# Patient Record
Sex: MALE | Race: OTHER | HISPANIC OR LATINO | URBAN - METROPOLITAN AREA
[De-identification: names, ages, dates, MRNs, and addresses within clinical notes are randomized per-mention and may not be internally consistent; named-entity substitution may affect disease eponyms.]

---

## 2022-05-12 ENCOUNTER — EMERGENCY (EMERGENCY)
Facility: HOSPITAL | Age: 72
LOS: 1 days | Discharge: HOME | End: 2022-05-12
Attending: STUDENT IN AN ORGANIZED HEALTH CARE EDUCATION/TRAINING PROGRAM | Admitting: STUDENT IN AN ORGANIZED HEALTH CARE EDUCATION/TRAINING PROGRAM
Payer: MEDICAID

## 2022-05-12 VITALS
OXYGEN SATURATION: 99 % | SYSTOLIC BLOOD PRESSURE: 162 MMHG | RESPIRATION RATE: 19 BRPM | HEART RATE: 62 BPM | TEMPERATURE: 98 F | DIASTOLIC BLOOD PRESSURE: 82 MMHG

## 2022-05-12 VITALS
HEIGHT: 71 IN | WEIGHT: 244.93 LBS | RESPIRATION RATE: 18 BRPM | OXYGEN SATURATION: 97 % | TEMPERATURE: 98 F | DIASTOLIC BLOOD PRESSURE: 93 MMHG | SYSTOLIC BLOOD PRESSURE: 183 MMHG | HEART RATE: 72 BPM

## 2022-05-12 LAB
ALBUMIN SERPL ELPH-MCNC: 4.1 G/DL — SIGNIFICANT CHANGE UP (ref 3.3–5)
ALP SERPL-CCNC: 79 U/L — SIGNIFICANT CHANGE UP (ref 40–120)
ALT FLD-CCNC: 25 U/L — SIGNIFICANT CHANGE UP (ref 10–45)
ANION GAP SERPL CALC-SCNC: 9 MMOL/L — SIGNIFICANT CHANGE UP (ref 5–17)
APTT BLD: 30.8 SEC — SIGNIFICANT CHANGE UP (ref 27.5–35.5)
AST SERPL-CCNC: 22 U/L — SIGNIFICANT CHANGE UP (ref 10–40)
BASOPHILS # BLD AUTO: 0.07 K/UL — SIGNIFICANT CHANGE UP (ref 0–0.2)
BASOPHILS NFR BLD AUTO: 1.1 % — SIGNIFICANT CHANGE UP (ref 0–2)
BILIRUB SERPL-MCNC: 0.2 MG/DL — SIGNIFICANT CHANGE UP (ref 0.2–1.2)
BUN SERPL-MCNC: 27 MG/DL — HIGH (ref 7–23)
CALCIUM SERPL-MCNC: 9.8 MG/DL — SIGNIFICANT CHANGE UP (ref 8.4–10.5)
CHLORIDE SERPL-SCNC: 100 MMOL/L — SIGNIFICANT CHANGE UP (ref 96–108)
CO2 SERPL-SCNC: 28 MMOL/L — SIGNIFICANT CHANGE UP (ref 22–31)
CREAT SERPL-MCNC: 1.85 MG/DL — HIGH (ref 0.5–1.3)
EGFR: 38 ML/MIN/1.73M2 — LOW
EOSINOPHIL # BLD AUTO: 0.17 K/UL — SIGNIFICANT CHANGE UP (ref 0–0.5)
EOSINOPHIL NFR BLD AUTO: 2.7 % — SIGNIFICANT CHANGE UP (ref 0–6)
GLUCOSE SERPL-MCNC: 155 MG/DL — HIGH (ref 70–99)
HCT VFR BLD CALC: 37.1 % — LOW (ref 39–50)
HGB BLD-MCNC: 12.1 G/DL — LOW (ref 13–17)
IMM GRANULOCYTES NFR BLD AUTO: 0.3 % — SIGNIFICANT CHANGE UP (ref 0–1.5)
INR BLD: 1.07 — SIGNIFICANT CHANGE UP (ref 0.88–1.16)
LYMPHOCYTES # BLD AUTO: 1.9 K/UL — SIGNIFICANT CHANGE UP (ref 1–3.3)
LYMPHOCYTES # BLD AUTO: 30.5 % — SIGNIFICANT CHANGE UP (ref 13–44)
MCHC RBC-ENTMCNC: 31.1 PG — SIGNIFICANT CHANGE UP (ref 27–34)
MCHC RBC-ENTMCNC: 32.6 GM/DL — SIGNIFICANT CHANGE UP (ref 32–36)
MCV RBC AUTO: 95.4 FL — SIGNIFICANT CHANGE UP (ref 80–100)
MONOCYTES # BLD AUTO: 0.6 K/UL — SIGNIFICANT CHANGE UP (ref 0–0.9)
MONOCYTES NFR BLD AUTO: 9.6 % — SIGNIFICANT CHANGE UP (ref 2–14)
NEUTROPHILS # BLD AUTO: 3.47 K/UL — SIGNIFICANT CHANGE UP (ref 1.8–7.4)
NEUTROPHILS NFR BLD AUTO: 55.8 % — SIGNIFICANT CHANGE UP (ref 43–77)
NRBC # BLD: 0 /100 WBCS — SIGNIFICANT CHANGE UP (ref 0–0)
PLATELET # BLD AUTO: 364 K/UL — SIGNIFICANT CHANGE UP (ref 150–400)
POTASSIUM SERPL-MCNC: 4.2 MMOL/L — SIGNIFICANT CHANGE UP (ref 3.5–5.3)
POTASSIUM SERPL-SCNC: 4.2 MMOL/L — SIGNIFICANT CHANGE UP (ref 3.5–5.3)
PROT SERPL-MCNC: 8 G/DL — SIGNIFICANT CHANGE UP (ref 6–8.3)
PROTHROM AB SERPL-ACNC: 12.8 SEC — SIGNIFICANT CHANGE UP (ref 10.5–13.4)
RBC # BLD: 3.89 M/UL — LOW (ref 4.2–5.8)
RBC # FLD: 13 % — SIGNIFICANT CHANGE UP (ref 10.3–14.5)
SARS-COV-2 RNA SPEC QL NAA+PROBE: NEGATIVE — SIGNIFICANT CHANGE UP
SODIUM SERPL-SCNC: 137 MMOL/L — SIGNIFICANT CHANGE UP (ref 135–145)
URATE SERPL-MCNC: 6.2 MG/DL — SIGNIFICANT CHANGE UP (ref 3.4–8.8)
WBC # BLD: 6.23 K/UL — SIGNIFICANT CHANGE UP (ref 3.8–10.5)
WBC # FLD AUTO: 6.23 K/UL — SIGNIFICANT CHANGE UP (ref 3.8–10.5)

## 2022-05-12 PROCEDURE — 80053 COMPREHEN METABOLIC PANEL: CPT

## 2022-05-12 PROCEDURE — 99284 EMERGENCY DEPT VISIT MOD MDM: CPT | Mod: 25

## 2022-05-12 PROCEDURE — 71046 X-RAY EXAM CHEST 2 VIEWS: CPT | Mod: 26

## 2022-05-12 PROCEDURE — 84550 ASSAY OF BLOOD/URIC ACID: CPT

## 2022-05-12 PROCEDURE — 85730 THROMBOPLASTIN TIME PARTIAL: CPT

## 2022-05-12 PROCEDURE — 73630 X-RAY EXAM OF FOOT: CPT

## 2022-05-12 PROCEDURE — 73630 X-RAY EXAM OF FOOT: CPT | Mod: 26,LT

## 2022-05-12 PROCEDURE — 87635 SARS-COV-2 COVID-19 AMP PRB: CPT

## 2022-05-12 PROCEDURE — 73620 X-RAY EXAM OF FOOT: CPT | Mod: 26

## 2022-05-12 PROCEDURE — 85025 COMPLETE CBC W/AUTO DIFF WBC: CPT

## 2022-05-12 PROCEDURE — 20600 DRAIN/INJ JOINT/BURSA W/O US: CPT | Mod: LT

## 2022-05-12 PROCEDURE — 71046 X-RAY EXAM CHEST 2 VIEWS: CPT

## 2022-05-12 PROCEDURE — 36415 COLL VENOUS BLD VENIPUNCTURE: CPT

## 2022-05-12 PROCEDURE — 85610 PROTHROMBIN TIME: CPT

## 2022-05-12 PROCEDURE — 96374 THER/PROPH/DIAG INJ IV PUSH: CPT | Mod: XU

## 2022-05-12 RX ORDER — PIPERACILLIN AND TAZOBACTAM 4; .5 G/20ML; G/20ML
3.38 INJECTION, POWDER, LYOPHILIZED, FOR SOLUTION INTRAVENOUS ONCE
Refills: 0 | Status: COMPLETED | OUTPATIENT
Start: 2022-05-12 | End: 2022-05-12

## 2022-05-12 RX ORDER — COLCHICINE 0.6 MG
1.2 TABLET ORAL ONCE
Refills: 0 | Status: COMPLETED | OUTPATIENT
Start: 2022-05-12 | End: 2022-05-12

## 2022-05-12 RX ORDER — COLCHICINE 0.6 MG
1 TABLET ORAL
Qty: 20 | Refills: 0
Start: 2022-05-12 | End: 2022-05-21

## 2022-05-12 RX ADMIN — Medication 1.2 MILLIGRAM(S): at 20:10

## 2022-05-12 RX ADMIN — PIPERACILLIN AND TAZOBACTAM 200 GRAM(S): 4; .5 INJECTION, POWDER, LYOPHILIZED, FOR SOLUTION INTRAVENOUS at 17:45

## 2022-05-12 NOTE — ED PROVIDER NOTE - CLINICAL SUMMARY MEDICAL DECISION MAKING FREE TEXT BOX
pt c/o worsening pain and swelling to L foot over past wk, no fevers/no trauma, given hx of diabetes - dose of abx given, but doubt cellulitis - more consistent w/gout, labs wnl. podiatry consulted for eval. dispo as per consult, pt signed out to dr sanchez pending podiatry eval and dispo

## 2022-05-12 NOTE — ED ADULT NURSE NOTE - CHIEF COMPLAINT QUOTE
Pt walked into ED with son complains of Right foot pain and erythema x2 weeks, worse in last x5 days. Swelling to foot noted. Denies injury. Denies any fevers, endorses recent (2 weeks ago) plane travel from [place]. Hx of DM.

## 2022-05-12 NOTE — ED ADULT TRIAGE NOTE - CHIEF COMPLAINT QUOTE
Pt walked into ED with son complains of Right foot pain and erythema x2 weeks, worse in last x5 days. Swelling to foot noted. Denies injury. Denies any fevers, endorses recent (2 weeks ago) plane travel from [place]. Hx of DM.

## 2022-05-12 NOTE — ED PROVIDER NOTE - NSFOLLOWUPCLINICS_GEN_ALL_ED_FT
[facility]  Family Medicine  [address]  [place], [place] [address]  Phone: [phone]  Fax:

## 2022-05-12 NOTE — ED PROVIDER NOTE - NSFOLLOWUPINSTRUCTIONS_ED_ALL_ED_FT
AS PLANNED WITH YOUR SON, TAKE 0.6MG COLCHICINE TONIGHT, ONE HOUR AFTER YOUR DOSE IN THE EMERGENCY DEPARTMENT.    ---    Gout       Gout is a condition that causes painful swelling of the joints. Gout is a type of inflammation of the joints (arthritis). This condition is caused by having too much uric acid in the body. Uric acid is a chemical that forms when the body breaks down substances called purines. Purines are important for building body proteins.    When the body has too much uric acid, sharp crystals can form and build up inside the joints. This causes pain and swelling. Gout attacks can happen quickly and may be very painful (acute gout). Over time, the attacks can affect more joints and become more frequent (chronic gout). Gout can also cause uric acid to build up under the skin and inside the kidneys.      What are the causes?    This condition is caused by too much uric acid in your blood. This can happen because:  •Your kidneys do not remove enough uric acid from your blood. This is the most common cause.      •Your body makes too much uric acid. This can happen with some cancers and cancer treatments. It can also occur if your body is breaking down too many red blood cells (hemolytic anemia).      •You eat too many foods that are high in purines. These foods include organ meats and some seafood. Alcohol, especially beer, is also high in purines.      A gout attack may be triggered by trauma or stress.      What increases the risk?    You are more likely to develop this condition if you:  •Have a family history of gout.      •Are male and middle-aged.      •Are female and have gone through menopause.      •Are obese.      •Frequently drink alcohol, especially beer.      •Are dehydrated.      •Lose weight too quickly.      •Have an organ transplant.      •Have lead poisoning.      •Take certain medicines, including aspirin, cyclosporine, diuretics, levodopa, and niacin.      •Have kidney disease.      •Have a skin condition called psoriasis.        What are the signs or symptoms?     An attack of acute gout happens quickly. It usually occurs in just one joint. The most common place is the big toe. Attacks often start at night. Other joints that may be affected include joints of the feet, ankle, knee, fingers, wrist, or elbow. Symptoms of this condition may include:  •Severe pain.      •Warmth.      •Swelling.      •Stiffness.      •Tenderness. The affected joint may be very painful to touch.      •Shiny, red, or purple skin.      •Chills and fever.      Chronic gout may cause symptoms more frequently. More joints may be involved. You may also have white or yellow lumps (tophi) on your hands or feet or in other areas near your joints.      How is this diagnosed?    This condition is diagnosed based on your symptoms, medical history, and physical exam. You may have tests, such as:  •Blood tests to measure uric acid levels.      •Removal of joint fluid with a thin needle (aspiration) to look for uric acid crystals.      •X-rays to look for joint damage.        How is this treated?    Treatment for this condition has two phases: treating an acute attack and preventing future attacks. Acute gout treatment may include medicines to reduce pain and swelling, including:  •NSAIDs.      •Steroids. These are strong anti-inflammatory medicines that can be taken by mouth (orally) or injected into a joint.      •Colchicine. This medicine relieves pain and swelling when it is taken soon after an attack. It can be given by mouth or through an IV.      Preventive treatment may include:  •Daily use of smaller doses of NSAIDs or colchicine.      •Use of a medicine that reduces uric acid levels in your blood.      •Changes to your diet. You may need to see a dietitian about what to eat and drink to prevent gout.        Follow these instructions at home:      During a gout attack    •If directed, put ice on the affected area:  •Put ice in a plastic bag.      •Place a towel between your skin and the bag.      •Leave the ice on for 20 minutes, 2–3 times a day.        •Raise (elevate) the affected joint above the level of your heart as often as possible.      •Rest the joint as much as possible. If the affected joint is in your leg, you may be given crutches to use.      •Follow instructions from your health care provider about eating or drinking restrictions.      Avoiding future gout attacks     •Follow a low-purine diet as told by your dietitian or health care provider. Avoid foods and drinks that are high in purines, including liver, kidney, anchovies, asparagus, herring, mushrooms, mussels, and beer.      •Maintain a healthy weight or lose weight if you are overweight. If you want to lose weight, talk with your health care provider. It is important that you do not lose weight too quickly.      •Start or maintain an exercise program as told by your health care provider.      Eating and drinking     •Drink enough fluids to keep your urine pale yellow.    •If you drink alcohol:•Limit how much you use to:  •0–1 drink a day for women.      •0–2 drinks a day for men.        •Be aware of how much alcohol is in your drink. In the [place] one drink equals one 12 oz bottle of beer (355 mL) one 5 oz glass of wine (148 mL), or one 1½ oz glass of hard liquor (44 mL).        General instructions     •Take over-the-counter and prescription medicines only as told by your health care provider.      • Do not drive or use heavy machinery while taking prescription pain medicine.      •Return to your normal activities as told by your health care provider. Ask your health care provider what activities are safe for you.      •Keep all follow-up visits as told by your health care provider. This is important.        Contact a health care provider if you have:    •Another gout attack.      •Continuing symptoms of a gout attack after 10 days of treatment.      •Side effects from your medicines.      •Chills or a fever.      •Burning pain when you urinate.      •Pain in your lower back or belly.        Get help right away if you:    •Have severe or uncontrolled pain.      •Cannot urinate.        Summary    •Gout is painful swelling of the joints caused by inflammation.      •The most common site of pain is the big toe, but it can affect other joints in the body.      •Medicines and dietary changes can help to prevent and treat gout attacks.      This information is not intended to replace advice given to you by your health care provider. Make sure you discuss any questions you have with your health care provider.

## 2022-05-12 NOTE — CONSULT NOTE ADULT - SUBJECTIVE AND OBJECTIVE BOX
Attending: Sandro Kelly DPM     Patient is a 71y old  Male who presents with a chief complaint of Pain in L 1st MTPJ    HPI: The pt is a 70 y/o M, who is brought to ED by son ( of choice), for pain and swelling to L foot x 2 wks, worse over past 5d. Pain is constant and dull, aggravated w/walking and touching, has not taken any pain meds, pain and swelling have remained the same, pt states that "had a similar problem in knee that needed to be drained". [place] here for eval (lives mostly in   here visiting son), also requesting rib xray because "got bumped by luggage" and now having soreness to chest at times. Denies injury, falls, fevers, chills, numbness or tingling to toes, calf pain, pus, bleeding.    Podiatry was consulted to r/o cellulitis. Patient's son states that pt has a hx of gout and has experienced this type of pain in the same area before, as well at the knee. Pt states that he has a Podiatrist back home in [place], however does not follow- up often. He states that he has had his knee aspirated in the past. Pt denies any other pmhx. Pt endorses to eating a lot of red meat.     Review of systems negative except per HPI    PAST MEDICAL & SURGICAL HISTORY:  Knee effusion, right      HTN (hypertension)      No significant past surgical history        Home Medications:    Allergies    No Known Allergies    Intolerances      FAMILY HISTORY:    Social History:       LABS                        12.1   6.23  )-----------( 364      ( 12 May 2022 16:27 )             37.1     05-12    137  |  100  |  27<H>  ----------------------------<  155<H>  4.2   |  28  |  1.85<H>    Ca    9.8      12 May 2022 16:27    TPro  8.0  /  Alb  4.1  /  TBili  0.2  /  DBili  x   /  AST  22  /  ALT  25  /  AlkPhos  79  05-12    PT/INR - ( 12 May 2022 16:27 )   PT: 12.8 sec;   INR: 1.07          PTT - ( 12 May 2022 16:27 )  PTT:30.8 sec    Vital Signs Last 24 Hrs  T(C): 36.4 (12 May 2022 18:03), Max: 36.8 (12 May 2022 15:03)  T(F): 97.5 (12 May 2022 18:03), Max: 98.3 (12 May 2022 15:03)  HR: 59 (12 May 2022 18:03) (59 - 72)  BP: 159/84 (12 May 2022 18:03) (159/84 - 183/93)  BP(mean): --  RR: 18 (12 May 2022 18:03) (18 - 18)  SpO2: 99% (12 May 2022 18:03) (97% - 99%)    PHYSICAL EXAM  General: NAD, AA0x3    Lower Extremity Focused:  Vasc: DP/PT 2/4, edematous L 1st MTPJ with slight erythema present  Derm: fluctuant L 1st MTPJ. Tinea Pedis and Xerosis present on the plantar aspect of the feet bilaterally. Onychomycosis x10.  Neuro: Protective sensations intact   MSK: Hallux rigidus of the 1st MTPJ, muscle strength 5/5 in all compartments     RADIOLOGY  Resident Wet Read: Positive for Jose Sign, indicative of gout at the 1st MTPJ. Osteopenic and erosive changes noted.

## 2022-05-12 NOTE — ED PROVIDER NOTE - PROGRESS NOTE DETAILS
Discussed with podiatry team, diagnosis is gout.  Will give colchicine load in ED, rx colchicine.  Plan to discharge home with return precautions and outpatient followup.

## 2022-05-12 NOTE — ED PROVIDER NOTE - OBJECTIVE STATEMENT
The pt is a 72 y/o M, who is brought to ED by son ( of choice), for pain and swelling to L foot x 2 wks, worse over past 5d. Pain is constant and dull, aggravated w/walking and touching, has not taken any pain meds, pain and swelling have remained the same, pt states that "had a similar problem in knee that needed to be drained". [place] here for eval (lives mostly in  - here visiting son), also requesting rib xray because "got bumped by luggage" and now having soreness to chest at times. Denies injury, falls, fevers, chills, numbness or tingling to toes, calf pain, pus, bleeding.

## 2022-05-12 NOTE — ED PROVIDER NOTE - MUSCULOSKELETAL, MLM
Spine appears normal, range of motion is not limited, no muscle or joint tenderness; L LE: + swelling, + warmth, tend over 1st MTP, a fluctuant collection to site, + tend to palp, FROM, pedal pulse 2+, no ascending lymphangitis, no calf tend

## 2022-05-12 NOTE — ED PROVIDER NOTE - PATIENT PORTAL LINK FT
You can access the FollowMyHealth Patient Portal offered by [facility] by registering at the following website: [email or web address]. By joining [employer]’s FollowMyHealth portal, you will also be able to view your health information using other applications (apps) compatible with our system.

## 2022-05-12 NOTE — ED PROVIDER NOTE - NS ED ATTENDING STATEMENT MOD
This was a shared visit with the CANDY. I reviewed and verified the documentation and independently performed the documented:

## 2022-05-12 NOTE — CONSULT NOTE ADULT - ASSESSMENT
a 70 y/o M, who is brought to ED by son ( of choice), for pain and swelling to L foot x 2 wks, worse over past 5d. Pt has a known hx of gout. Xrays indicate a gouty flare: Positive for Jose Sign, indicative of gout at the 1st MTPJ. Osteopenic and erosive changes noted. 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     Plan:   - Aspirated the L 1st MTPJ via 18 gauge needle: tophus exudate was noted   - L foot was dressed with DSD   - Advised pt to avoid eating red meats, wine etc.  - Rx'd Colchicine 1.2mg loading dose and 0.6mg and hour later for gouty attack  - Rx'd 0.6mg Colchicine PO BID for the next few days and stop if there is any GI discomfort  - Rx'd Clotrimazole 1% cream BID for the plantar aspect of the foot bilaterally for tinea pedis     Patient should follow up with Dr. Sandro Kelly within 1 week of discharge.    Office information:          [place] Address- [address], [place] [address] Phone: [phone]         [place] Address- [address], [place] [address] Phone: [phone]

## 2022-05-12 NOTE — ED PROVIDER NOTE - ATTENDING APP SHARED VISIT CONTRIBUTION OF CARE
71M with DM, now with R medial distal foot pain and swelling, on exam fluctuant tender collection, pending XR and podiatry for appropriate management and disposition.

## 2022-05-16 DIAGNOSIS — M79.672 PAIN IN LEFT FOOT: ICD-10-CM

## 2022-05-16 DIAGNOSIS — Z20.822 CONTACT WITH AND (SUSPECTED) EXPOSURE TO COVID-19: ICD-10-CM

## 2022-05-16 DIAGNOSIS — M79.89 OTHER SPECIFIED SOFT TISSUE DISORDERS: ICD-10-CM

## 2022-05-16 DIAGNOSIS — M10.9 GOUT, UNSPECIFIED: ICD-10-CM

## 2022-05-16 DIAGNOSIS — E66.3 OVERWEIGHT: ICD-10-CM

## 2022-05-26 PROBLEM — Z00.00 ENCOUNTER FOR PREVENTIVE HEALTH EXAMINATION: Status: ACTIVE | Noted: 2022-05-26

## 2022-05-27 ENCOUNTER — APPOINTMENT (OUTPATIENT)
Dept: INTERNAL MEDICINE | Facility: CLINIC | Age: 72
End: 2022-05-27
Payer: MEDICAID

## 2022-05-27 ENCOUNTER — NON-APPOINTMENT (OUTPATIENT)
Age: 72
End: 2022-05-27

## 2022-05-27 VITALS
HEART RATE: 73 BPM | SYSTOLIC BLOOD PRESSURE: 162 MMHG | BODY MASS INDEX: 29.85 KG/M2 | WEIGHT: 258 LBS | DIASTOLIC BLOOD PRESSURE: 77 MMHG | OXYGEN SATURATION: 100 % | TEMPERATURE: 97.9 F | HEIGHT: 78 IN

## 2022-05-27 DIAGNOSIS — I10 ESSENTIAL (PRIMARY) HYPERTENSION: ICD-10-CM

## 2022-05-27 DIAGNOSIS — M10.9 GOUT, UNSPECIFIED: ICD-10-CM

## 2022-05-27 DIAGNOSIS — E11.49 TYPE 2 DIABETES MELLITUS WITH OTHER DIABETIC NEUROLOGICAL COMPLICATION: ICD-10-CM

## 2022-05-27 DIAGNOSIS — E11.9 TYPE 2 DIABETES MELLITUS W/OUT COMPLICATIONS: ICD-10-CM

## 2022-05-27 DIAGNOSIS — R60.0 LOCALIZED EDEMA: ICD-10-CM

## 2022-05-27 PROCEDURE — 93000 ELECTROCARDIOGRAM COMPLETE: CPT

## 2022-05-27 PROCEDURE — 99204 OFFICE O/P NEW MOD 45 MIN: CPT | Mod: 25

## 2022-05-27 RX ORDER — COLCHICINE 0.6 MG/1
0.6 TABLET ORAL TWICE DAILY
Qty: 28 | Refills: 0 | Status: ACTIVE | COMMUNITY
Start: 2022-05-27 | End: 1900-01-01

## 2022-05-27 RX ORDER — NIFEDIPINE 90 MG/1
90 TABLET, EXTENDED RELEASE ORAL DAILY
Qty: 60 | Refills: 0 | Status: ACTIVE | COMMUNITY
Start: 2022-05-27 | End: 1900-01-01

## 2022-05-27 RX ORDER — CANDESARTAN CILEXETIL AND HYDROCHLOROTHIAZIDE 32; 25 MG/1; MG/1
32-25 TABLET ORAL DAILY
Qty: 30 | Refills: 2 | Status: ACTIVE | COMMUNITY
Start: 2022-05-27 | End: 1900-01-01

## 2022-05-31 ENCOUNTER — NON-APPOINTMENT (OUTPATIENT)
Age: 72
End: 2022-05-31

## 2022-05-31 DIAGNOSIS — D64.9 ANEMIA, UNSPECIFIED: ICD-10-CM

## 2022-05-31 DIAGNOSIS — E78.5 HYPERLIPIDEMIA, UNSPECIFIED: ICD-10-CM

## 2022-05-31 DIAGNOSIS — Z12.11 ENCOUNTER FOR SCREENING FOR MALIGNANT NEOPLASM OF COLON: ICD-10-CM

## 2022-05-31 LAB
ALBUMIN SERPL ELPH-MCNC: 4.3 G/DL
ALDOSTERONE SERUM: 14.6 NG/DL
ALP BLD-CCNC: 79 U/L
ALT SERPL-CCNC: 35 U/L
ANION GAP SERPL CALC-SCNC: 13 MMOL/L
AST SERPL-CCNC: 21 U/L
BASOPHILS # BLD AUTO: 0.11 K/UL
BASOPHILS NFR BLD AUTO: 2.2 %
BILIRUB SERPL-MCNC: 0.2 MG/DL
BUN SERPL-MCNC: 26 MG/DL
CALCIUM SERPL-MCNC: 9.6 MG/DL
CHLORIDE SERPL-SCNC: 103 MMOL/L
CHOLEST SERPL-MCNC: 238 MG/DL
CO2 SERPL-SCNC: 24 MMOL/L
CREAT SERPL-MCNC: 1.57 MG/DL
EGFR: 47 ML/MIN/1.73M2
EOSINOPHIL # BLD AUTO: 0.15 K/UL
EOSINOPHIL NFR BLD AUTO: 3 %
ESTIMATED AVERAGE GLUCOSE: 272 MG/DL
GLUCOSE SERPL-MCNC: 156 MG/DL
HBA1C MFR BLD HPLC: 11.1 %
HCT VFR BLD CALC: 37.3 %
HDLC SERPL-MCNC: 25 MG/DL
HGB BLD-MCNC: 11.9 G/DL
IMM GRANULOCYTES NFR BLD AUTO: 0 %
LDLC SERPL CALC-MCNC: 134 MG/DL
LYMPHOCYTES # BLD AUTO: 1.71 K/UL
LYMPHOCYTES NFR BLD AUTO: 34.7 %
MAN DIFF?: NORMAL
MCHC RBC-ENTMCNC: 30.7 PG
MCHC RBC-ENTMCNC: 31.9 GM/DL
MCV RBC AUTO: 96.1 FL
MONOCYTES # BLD AUTO: 0.44 K/UL
MONOCYTES NFR BLD AUTO: 8.9 %
NEUTROPHILS # BLD AUTO: 2.52 K/UL
NEUTROPHILS NFR BLD AUTO: 51.2 %
NONHDLC SERPL-MCNC: 213 MG/DL
PLATELET # BLD AUTO: 313 K/UL
POTASSIUM SERPL-SCNC: 4.4 MMOL/L
PROT SERPL-MCNC: 7.7 G/DL
RBC # BLD: 3.88 M/UL
RBC # FLD: 13.7 %
SODIUM SERPL-SCNC: 140 MMOL/L
TRIGL SERPL-MCNC: 391 MG/DL
WBC # FLD AUTO: 4.93 K/UL

## 2022-05-31 RX ORDER — ATORVASTATIN CALCIUM 40 MG/1
40 TABLET, FILM COATED ORAL
Qty: 90 | Refills: 0 | Status: ACTIVE | COMMUNITY
Start: 2022-05-31 | End: 1900-01-01

## 2022-06-02 ENCOUNTER — NON-APPOINTMENT (OUTPATIENT)
Age: 72
End: 2022-06-02

## 2022-06-02 LAB
FERRITIN SERPL-MCNC: 170 NG/ML
IRON SATN MFR SERPL: 20 %
IRON SERPL-MCNC: 65 UG/DL
TIBC SERPL-MCNC: 330 UG/DL
TRANSFERRIN SERPL-MCNC: 266 MG/DL
UIBC SERPL-MCNC: 266 UG/DL

## 2022-06-07 ENCOUNTER — APPOINTMENT (OUTPATIENT)
Dept: INTERNAL MEDICINE | Facility: CLINIC | Age: 72
End: 2022-06-07